# Patient Record
Sex: FEMALE | Race: WHITE | Employment: FULL TIME | URBAN - METROPOLITAN AREA
[De-identification: names, ages, dates, MRNs, and addresses within clinical notes are randomized per-mention and may not be internally consistent; named-entity substitution may affect disease eponyms.]

---

## 2017-01-03 ENCOUNTER — OFFICE VISIT (OUTPATIENT)
Dept: OCCUPATIONAL MEDICINE | Facility: HOSPITAL | Age: 37
End: 2017-01-03
Attending: ORTHOPAEDIC SURGERY

## 2017-01-03 ENCOUNTER — OFFICE VISIT (OUTPATIENT)
Dept: ORTHOPEDICS CLINIC | Facility: CLINIC | Age: 37
End: 2017-01-03

## 2017-01-03 DIAGNOSIS — S63.641A SPRAIN OF METACARPOPHALANGEAL JOINT OF RIGHT THUMB, INITIAL ENCOUNTER: Primary | ICD-10-CM

## 2017-01-03 PROCEDURE — 97760 ORTHOTIC MGMT&TRAING 1ST ENC: CPT | Performed by: OCCUPATIONAL THERAPIST

## 2017-01-03 PROCEDURE — 99212 OFFICE O/P EST SF 10 MIN: CPT | Performed by: ORTHOPAEDIC SURGERY

## 2017-01-03 PROCEDURE — 99243 OFF/OP CNSLTJ NEW/EST LOW 30: CPT | Performed by: ORTHOPAEDIC SURGERY

## 2017-01-03 NOTE — PROGRESS NOTES
1/3/2017  Emry Jack Search  2/25/1980  39year old   female  Aura Hernandez MD    HPI:   Patient presents with:  Consult: Pt is moving. She jammed her right thumb. Happened end of Novemeber. Pt then hyper extended it also.   Pt saw Jaret Morrison and Siobhan Persons [id number]     Cortisone injections 8255,8877   • Hirsutism    • Amblyopia      Left    • History of mammogram April 2015     per NG   • IBS (irritable bowel syndrome) 4/2016   • Migraines 9/1998          Past Surgical History    REMOV SESAMOID BONE,1ST TOE reveal MCP hyperextension.       ASSESSMENT AND PLAN:   Sprain of metacarpophalangeal joint of right thumb, initial encounter  (primary encounter diagnosis), 6 weeks after injury    The risks, indications, benefits, and procedures of both operative and non-

## 2017-01-03 NOTE — PROGRESS NOTES
SPLINT EVALUATION:   Referring Physician: Dr. Arron Saenz  November, 23, 2016 Date of Service: 1/3/2017   Sprain of right thumb MCP  PATIENT SUMMARY   Dennis0 CHRISTOPHER Pa is a 39year old y/o female who presents to therapy today with complaints of pain with thu please contact me at Dept: [phone]    Sincerely,  Electronically signed by therapist: Jethro Landrum OT    [de-identified] certification required: Yes  I certify the need for these services furnished under this plan of treatment and while under my care.

## 2017-08-31 DIAGNOSIS — K58.0 IRRITABLE BOWEL SYNDROME WITH DIARRHEA: ICD-10-CM

## 2017-08-31 NOTE — TELEPHONE ENCOUNTER
Patient would like scripts printed and mailed to the address on file.     Refill Protocol Appointment Criteria  · Appointment scheduled in the past 6 months or in the next 3 months  Recent Outpatient Visits            8 months ago Sprain of metacarpophalang

## 2017-09-04 RX ORDER — ONDANSETRON 4 MG/1
TABLET, ORALLY DISINTEGRATING ORAL
Qty: 90 TABLET | Refills: 3 | Status: SHIPPED | OUTPATIENT
Start: 2017-09-04

## 2017-09-04 RX ORDER — SUMATRIPTAN 100 MG/1
TABLET, FILM COATED ORAL
Qty: 9 TABLET | Refills: 3 | Status: SHIPPED | OUTPATIENT
Start: 2017-09-04

## 2017-09-04 RX ORDER — PANTOPRAZOLE SODIUM 40 MG/1
TABLET, DELAYED RELEASE ORAL
Qty: 90 TABLET | Refills: 3 | Status: SHIPPED | OUTPATIENT
Start: 2017-09-04

## 2017-09-07 ENCOUNTER — TELEPHONE (OUTPATIENT)
Dept: OBGYN CLINIC | Facility: CLINIC | Age: 37
End: 2017-09-07

## 2017-09-07 NOTE — TELEPHONE ENCOUNTER
Had annual exam done in April in [address] [place] [address], would like records updated.

## 2017-09-07 NOTE — TELEPHONE ENCOUNTER
Pt states she moved to [facility] and had an annual there. Pt would like the reminder phone calls to stop. Adjusted pt's health maintenance section in epic and calls should now stop.

## 2017-09-14 ENCOUNTER — TELEPHONE (OUTPATIENT)
Dept: GASTROENTEROLOGY | Facility: CLINIC | Age: 37
End: 2017-09-14

## 2017-09-14 NOTE — TELEPHONE ENCOUNTER
Received a \"CRM request\" through [employer] sent to GI in error. No prescrptions on file from Dr Toyin Varghese.  Forward to Dr Abad Rose staff    From: [employer] Risk: 9/14/2017  10:33 AM   To: Manuela Halsted [facility] Customer Response Pool   Subject: Follow up

## 2017-09-14 NOTE — TELEPHONE ENCOUNTER
Please see patient's Offermat[employer] message were these scripts sent to the patient in [place]. I re pended int the print mode for 30 days. Please have OV send to the patient and send a Offermat[employer] message to the patient. Thanks.

## 2017-09-15 RX ORDER — SUMATRIPTAN 100 MG/1
TABLET, FILM COATED ORAL
Qty: 9 TABLET | Refills: 3 | OUTPATIENT
Start: 2017-09-15

## 2017-09-15 RX ORDER — PANTOPRAZOLE SODIUM 40 MG/1
TABLET, DELAYED RELEASE ORAL
Qty: 30 TABLET | Refills: 0 | OUTPATIENT
Start: 2017-09-15

## 2017-09-15 RX ORDER — ONDANSETRON 4 MG/1
TABLET, ORALLY DISINTEGRATING ORAL
Qty: 60 TABLET | Refills: 0 | OUTPATIENT
Start: 2017-09-15

## 2017-10-11 NOTE — TELEPHONE ENCOUNTER
The pt states that she is still receiving reminder calls, and would like them to stop. The pt was unwilling to have her number removed from the [facility] system because she still communicates with other doctors at [facility].  Please advise.

## (undated) NOTE — MR AVS SNAPSHOT
Miguel Herrera [address]  [phone]  [phone]               Thank you for choosing us for your health care visit with Winnie Dorantes OT.   We are glad to serve you and happy to provide you with MyChart     Visit [facility] [facility]  You can access your MyChart to more actively manage your health care and view more details from this visit by going to [email or web address].   If you've recently had a stay at the Hospital you can access your d

## (undated) NOTE — MR AVS SNAPSHOT
[place]  [address]  [phone]               Thank you for choosing us for your health care visit with Gautam Hood MD.  We are glad to serve you and happy to provide you with this summa Commonly known as:  ORTHO TRI-CYCLEN (28)           ondansetron 4 MG Tbdp   TAKE 2 TABLETS EVERY 8 HRS FOR 2 DAYS. PLACE ON TOP OF THE TONGUE WHERE IT WILL DISSOLVE, THEN SWALLOW, AS DIRECTED.    Commonly known as:  ZOFRAN-ODT           Pantoprazole Sodium Right thumb dorsal blocking splint at 30 degrees of flexion at MCP joint, allow full flexion      Referral Information     Referral Order Referred to  Syeda Zamora Phone Visits Status Diagnosis    OCCUPATIONAL THERAPY - INTERNAL [29765]     3 Open [2] Spra